# Patient Record
Sex: FEMALE | Race: WHITE | NOT HISPANIC OR LATINO | URBAN - NONMETROPOLITAN AREA
[De-identification: names, ages, dates, MRNs, and addresses within clinical notes are randomized per-mention and may not be internally consistent; named-entity substitution may affect disease eponyms.]

---

## 2022-11-14 ENCOUNTER — TRANSCRIBE ORDERS (OUTPATIENT)
Dept: CARDIOLOGY CLINIC | Facility: CLINIC | Age: 62
End: 2022-11-14

## 2022-11-14 DIAGNOSIS — N83.299 COMPLEX OVARIAN CYST: Primary | ICD-10-CM

## 2022-12-01 RX ORDER — PROPRANOLOL HYDROCHLORIDE 40 MG/1
40 TABLET ORAL 2 TIMES DAILY
COMMUNITY
Start: 2022-09-03

## 2022-12-01 RX ORDER — PANTOPRAZOLE SODIUM 40 MG/1
40 TABLET, DELAYED RELEASE ORAL DAILY
COMMUNITY
Start: 2022-09-03

## 2022-12-01 RX ORDER — CLOTRIMAZOLE 1 %
CREAM (GRAM) TOPICAL
COMMUNITY
Start: 2022-11-18

## 2022-12-01 RX ORDER — MULTIVIT,IRON,MINERALS/LUTEIN
1 TABLET ORAL DAILY
COMMUNITY

## 2022-12-01 RX ORDER — GABAPENTIN 300 MG/1
300 CAPSULE ORAL 2 TIMES DAILY
COMMUNITY

## 2022-12-01 RX ORDER — IBUPROFEN 800 MG/1
800 TABLET ORAL
COMMUNITY
Start: 2022-07-11

## 2022-12-01 RX ORDER — METFORMIN HYDROCHLORIDE 500 MG/1
TABLET, EXTENDED RELEASE ORAL
COMMUNITY
Start: 2022-09-28 | End: 2022-12-06 | Stop reason: ALTCHOICE

## 2022-12-01 RX ORDER — QUETIAPINE FUMARATE 50 MG/1
TABLET, FILM COATED ORAL
COMMUNITY
Start: 2022-06-23

## 2022-12-06 ENCOUNTER — APPOINTMENT (OUTPATIENT)
Dept: LAB | Facility: HOSPITAL | Age: 62
End: 2022-12-06

## 2022-12-06 ENCOUNTER — OFFICE VISIT (OUTPATIENT)
Dept: LAB | Facility: HOSPITAL | Age: 62
End: 2022-12-06

## 2022-12-06 ENCOUNTER — CONSULT (OUTPATIENT)
Dept: GYNECOLOGIC ONCOLOGY | Facility: CLINIC | Age: 62
End: 2022-12-06

## 2022-12-06 VITALS
BODY MASS INDEX: 38.25 KG/M2 | TEMPERATURE: 96.2 F | OXYGEN SATURATION: 99 % | SYSTOLIC BLOOD PRESSURE: 120 MMHG | DIASTOLIC BLOOD PRESSURE: 80 MMHG | WEIGHT: 238 LBS | HEIGHT: 66 IN | HEART RATE: 69 BPM

## 2022-12-06 DIAGNOSIS — N83.8 OVARIAN MASS, LEFT: Primary | ICD-10-CM

## 2022-12-06 DIAGNOSIS — N83.8 OVARIAN MASS, LEFT: ICD-10-CM

## 2022-12-06 LAB
ALBUMIN SERPL BCP-MCNC: 4.1 G/DL (ref 3.5–5)
ALP SERPL-CCNC: 51 U/L (ref 46–116)
ALT SERPL W P-5'-P-CCNC: 72 U/L (ref 12–78)
ANION GAP SERPL CALCULATED.3IONS-SCNC: 10 MMOL/L (ref 4–13)
AST SERPL W P-5'-P-CCNC: 45 U/L (ref 5–45)
BILIRUB SERPL-MCNC: 0.71 MG/DL (ref 0.2–1)
BUN SERPL-MCNC: 12 MG/DL (ref 5–25)
CALCIUM SERPL-MCNC: 9.9 MG/DL (ref 8.3–10.1)
CHLORIDE SERPL-SCNC: 102 MMOL/L (ref 96–108)
CO2 SERPL-SCNC: 26 MMOL/L (ref 21–32)
CREAT SERPL-MCNC: 1.01 MG/DL (ref 0.6–1.3)
GFR SERPL CREATININE-BSD FRML MDRD: 59 ML/MIN/1.73SQ M
GLUCOSE SERPL-MCNC: 82 MG/DL (ref 65–140)
POTASSIUM SERPL-SCNC: 3.9 MMOL/L (ref 3.5–5.3)
PROT SERPL-MCNC: 7.3 G/DL (ref 6.4–8.4)
SODIUM SERPL-SCNC: 138 MMOL/L (ref 135–147)

## 2022-12-06 RX ORDER — ACETAMINOPHEN 325 MG/1
975 TABLET ORAL ONCE
OUTPATIENT
Start: 2022-12-06 | End: 2022-12-06

## 2022-12-06 RX ORDER — HEPARIN SODIUM 5000 [USP'U]/ML
5000 INJECTION, SOLUTION INTRAVENOUS; SUBCUTANEOUS
OUTPATIENT
Start: 2022-12-07 | End: 2022-12-08

## 2022-12-06 RX ORDER — GABAPENTIN 100 MG/1
200 CAPSULE ORAL ONCE
OUTPATIENT
Start: 2022-12-06 | End: 2022-12-06

## 2022-12-06 RX ORDER — CELECOXIB 100 MG/1
200 CAPSULE ORAL ONCE
OUTPATIENT
Start: 2022-12-06 | End: 2022-12-06

## 2022-12-06 NOTE — ASSESSMENT & PLAN NOTE
4 8 cm in largest diameter by report  Some vascularity  Patient wants definitive surgical intervention  In addition, she has had multiple instances of postmenopausal bleeding the last in July with remote history of endometrial ablation  Endometrial biopsy is not feasible due to cervical stenosis  After counseling, she wishes to proceed with definitive surgical treatment and agrees to proceed with robotic hysterectomy, bilateral salpingo-oophorectomy, possible staging and all other indicated procedures  Patient has good exercise tolerance and no additional cardiovascular work-up is indicated  Preoperative testing as per procedure pass  I discussed with patient indication, risks, benefits and alternatives of surgical exploration  We discussed possibility of bleeding requiring blood transfusion, life-threatening infections requiring additional procedures, injuries to surrounding organs such as bladder, ureters, gastrointestinal tract and or neurovascular structures  Additionally, we discussed general risks associated to stress of surgery such as venous thromboembolism, acute myocardial events and stroke  All questions answered to patient's satisfaction  Agrees to proceed with blood transfusions if those are deemed necessary  Understands minimal risk of hypersensitivity reactions and infection with blood-borne pathogens  Understands minimal risk of conversion to laparotomy  She agrees and wants to proceed  Informed consent form signed

## 2022-12-06 NOTE — PROGRESS NOTES
Assessment/Plan:    Problem List Items Addressed This Visit        Other    Ovarian mass, left - Primary     4 8 cm in largest diameter by report  Some vascularity  Patient wants definitive surgical intervention  In addition, she has had multiple instances of postmenopausal bleeding the last in July with remote history of endometrial ablation  Endometrial biopsy is not feasible due to cervical stenosis  After counseling, she wishes to proceed with definitive surgical treatment and agrees to proceed with robotic hysterectomy, bilateral salpingo-oophorectomy, possible staging and all other indicated procedures  Patient has good exercise tolerance and no additional cardiovascular work-up is indicated  Preoperative testing as per procedure pass  I discussed with patient indication, risks, benefits and alternatives of surgical exploration  We discussed possibility of bleeding requiring blood transfusion, life-threatening infections requiring additional procedures, injuries to surrounding organs such as bladder, ureters, gastrointestinal tract and or neurovascular structures  Additionally, we discussed general risks associated to stress of surgery such as venous thromboembolism, acute myocardial events and stroke  All questions answered to patient's satisfaction  Agrees to proceed with blood transfusions if those are deemed necessary  Understands minimal risk of hypersensitivity reactions and infection with blood-borne pathogens  Understands minimal risk of conversion to laparotomy  She agrees and wants to proceed  Informed consent form signed                  Relevant Orders    Case request operating room: Robotic hysterectomy, bilateral salpingo-oophorectomy, possible staging and all other indicated procedures (Completed)    Comprehensive metabolic panel    EKG 12 lead    Type and screen           CHIEF COMPLAINT:   For consultation management of pelvic mass and recurrent episodes of postmenopausal bleeding      Patient ID: Mary Zaman is a 60 y o  female  HPI  56-year-old morbidly obese white female  A3 with remote history of endometrial ablation at around 10 to 15 years ago  Reports multiple instances of postmenopausal bleeding, last episode in July of this year  No attempted biopsy has been done as per patient's report  Recently presented to emergency department in [facility] after trauma and CT scan demonstrated incidental left ovarian mass  Ultrasound speaks of 4 8 cm vascular left ovarian mass  Images not available to review  Patient presents today as she wishes to proceed with definitive surgical treatment  Last mammogram: Spring 2022  Reportedly negative  Last colonoscopy: 8 years ago  Reportedly negative  Review of Systems  As per HPI  Review of systems otherwise unremarkable  Current Outpatient Medications   Medication Sig Dispense Refill   • clotrimazole (LOTRIMIN) 1 % cream APPLY TO AFFECTED AREA(S) TWO TIMES A DAY AS NEEDED AT CORNERS OF THE MOUTH  • gabapentin (NEURONTIN) 300 mg capsule Take 300 mg by mouth 2 (two) times a day     • ibuprofen (MOTRIN) 800 mg tablet Take 800 mg by mouth     • Multiple Vitamins-Minerals (Centrum Silver Ultra Womens) TABS Take 1 tablet by mouth daily     • pantoprazole (PROTONIX) 40 mg tablet Take 40 mg by mouth daily     • propranolol (INDERAL) 40 mg tablet Take 40 mg by mouth 2 (two) times a day     • QUEtiapine (SEROquel) 50 mg tablet 50mg bid, 300mg qhs       No current facility-administered medications for this visit         Allergies   Allergen Reactions   • Pollen Extract Other (See Comments)       Past Medical History:   Diagnosis Date   • Achalasia of cardia    • Alcoholism ([facility] [address] )    • Bipolar 1 disorder ([address] )    • Cholesteatoma of left ear    • Chronic lower back pain    • Chronic mastoiditis of left side    • Combined opioid and non-opioid drug dependence, in remission ([address] )     8 years clean   • Fatty liver    • Hiatal hernia    • HTN (hypertension)    • Obesity    • Sicca syndrome [facility])        Past Surgical History:   Procedure Laterality Date   • APPENDECTOMY     • BLADDER REPAIR  06/17/2017    vaginal sling procedure for stress incontinence   • COLONOSCOPY  04/04/2013   • CYSTOSCOPY  06/17/2017   • CYSTOURETHROSCOPY     • DILATION AND CURETTAGE OF UTERUS      SAB x2   • EGD  12/21/2017   • FASCIOTOMY Left 10/30/2018    left plantar   • HYSTEROSCOPY  01/01/2006    endometrial ablation   • LAPAROSCOPIC CHOLECYSTECTOMY  11/13/2017   • MASTOIDECTOMY  02/25/2022    remove mastoid structures   • MYRINGOTOMY Left 02/25/2022   • NASAL SEPTUM SURGERY  1992   • POLYPECTOMY  04/02/2013    w/wo D&C   • TONSILECTOMY AND ADNOIDECTOMY      age 14   • TYMPANOPLASTY  07/28/2022    left ear tube 10/6/2022       OB History    No obstetric history on file  Family History   Problem Relation Age of Onset   • Hypertension Mother    • Stroke Father    • Alcohol abuse Father    • Hypertension Sister    • Hypertension Brother    • Tourette syndrome Brother    • Ovarian cancer Maternal Grandmother    • Leukemia Maternal Grandfather    • Breast cancer Paternal Grandmother        The following portions of the patient's history were reviewed and updated as appropriate: allergies, current medications, past family history, past medical history, past social history, past surgical history and problem list       Objective:    Blood pressure 120/80, pulse 69, temperature (!) 96 2 °F (35 7 °C), height 5' 6" (1 676 m), weight 108 kg (238 lb), SpO2 99 %  Body mass index is 38 41 kg/m²  Physical Exam  Vitals reviewed  Exam conducted with a chaperone present  Constitutional:       Appearance: Normal appearance  She is obese  Eyes:      General: No scleral icterus  Right eye: No discharge  Left eye: No discharge  Conjunctiva/sclera: Conjunctivae normal    Cardiovascular:      Rate and Rhythm: Normal rate and regular rhythm  Heart sounds: Normal heart sounds  No murmur heard  Pulmonary:      Effort: Pulmonary effort is normal       Breath sounds: Normal breath sounds  No rhonchi or rales  Abdominal:      General: There is no distension  Palpations: Abdomen is soft  There is no mass  Tenderness: There is no abdominal tenderness  There is no guarding  Hernia: No hernia is present  Genitourinary:     Comments: Normal external female genitalia  Normal [place]th[place]'s [place]'s glands  Normal urethral meatus and no evidence of urethral discharge or masses  Bladder without fullness mass or tenderness  Vagina without lesion or discharge  No significant pelvic organ prolapse noted  Cervix grossly normal appearing without visible lesions  Uterus nontender with normal contour, size and mobility  Adnexae without mass or tenderness  Anus without fissure of lesion  Musculoskeletal:      Right lower leg: No edema  Left lower leg: No edema  Neurological:      Mental Status: She is alert  Pelvic ultrasound report November 10, 2022 at [facility] [place]: Uterus 3 x 3 7 x 6 2 cm  Left ovary 4 1 x 4 4 x 4 8 cm with abnormal vascularity noted  No discrete masses  Right ovary not seen  The images are not available for review      Maud Carrel, MD, [address] [place] [address]  12/6/2022  3:17 PM

## 2022-12-07 ENCOUNTER — LAB REQUISITION (OUTPATIENT)
Dept: LAB | Facility: HOSPITAL | Age: 62
End: 2022-12-07

## 2022-12-07 DIAGNOSIS — N83.8 OTHER NONINFLAMMATORY DISORDERS OF OVARY, FALLOPIAN TUBE AND BROAD LIGAMENT: ICD-10-CM

## 2022-12-07 LAB
ABO GROUP BLD: NORMAL
BLD GP AB SCN SERPL QL: NEGATIVE
RH BLD: POSITIVE
SPECIMEN EXPIRATION DATE: NORMAL

## 2022-12-11 LAB
ATRIAL RATE: 66 BPM
P AXIS: 20 DEGREES
PR INTERVAL: 162 MS
QRS AXIS: -7 DEGREES
QRSD INTERVAL: 96 MS
QT INTERVAL: 400 MS
QTC INTERVAL: 419 MS
T WAVE AXIS: -10 DEGREES
VENTRICULAR RATE: 66 BPM

## 2023-01-03 ENCOUNTER — APPOINTMENT (OUTPATIENT)
Dept: PREADMISSION TESTING | Facility: HOSPITAL | Age: 63
End: 2023-01-03

## 2023-01-04 ENCOUNTER — ANESTHESIA EVENT (OUTPATIENT)
Dept: PERIOP | Facility: HOSPITAL | Age: 63
End: 2023-01-04

## 2023-01-04 RX ORDER — LITHIUM CARBONATE 300 MG/1
300 CAPSULE ORAL
COMMUNITY

## 2023-01-04 RX ORDER — OMEGA-3S/DHA/EPA/FISH OIL/D3 300MG-1000
400 CAPSULE ORAL DAILY
COMMUNITY

## 2023-01-04 RX ORDER — LITHIUM CARBONATE 150 MG/1
150 CAPSULE ORAL EVERY MORNING
COMMUNITY

## 2023-01-04 NOTE — PRE-PROCEDURE INSTRUCTIONS
Pre-Surgery Instructions:   Medication Instructions   • cholecalciferol (VITAMIN D3) 400 units tablet Stop taking 7 days prior to surgery  • clotrimazole (LOTRIMIN) 1 % cream hold after surgical showers   • gabapentin (NEURONTIN) 300 mg capsule Take day of surgery  • ibuprofen (MOTRIN) 800 mg tablet Stop taking 7 days prior to surgery  • lithium carbonate 150 mg capsule Take day of surgery  • lithium carbonate 300 mg capsule Take night before surgery   • Multiple Vitamins-Minerals (Centrum Silver Ultra Womens) TABS Stop taking 7 days prior to surgery  • pantoprazole (PROTONIX) 40 mg tablet Take day of surgery  • propranolol (INDERAL) 40 mg tablet Take day of surgery  • QUEtiapine (SEROquel) 50 mg tablet Take night before surgery      - Reviewed with patient, in detail, instructions from "My Surgical Experience"  - Instructed to avoid all OTC vitamins/supplements and NSAIDS for one week prior to surgery per anesthesia guidelines  Tylenol ok to take PRN  - Advised patient nothing eat or drink after midnight prior to surgery, except medications that he/she is to take morning DOS with only small sip of water     - Advised patient that Wyatt Horn will call with surgery arrival time and hospital directions the business day prior to surgery  Patient verbalized understanding of current visitor restrictions/masking guidelines and advised that he/she can confirm these at time of arrival call with Wyatt Horn      - Patient verbalized understanding and knows to call surgeon's office with any additional questions prior to surgery  Instructed to call surgeon's office in meantime with any new illnesses/exposure, patient verbalized understanding

## 2023-01-12 ENCOUNTER — ANESTHESIA (OUTPATIENT)
Dept: PERIOP | Facility: HOSPITAL | Age: 63
End: 2023-01-12

## 2023-01-12 ENCOUNTER — HOSPITAL ENCOUNTER (OUTPATIENT)
Facility: HOSPITAL | Age: 63
Setting detail: OUTPATIENT SURGERY
Discharge: HOME | End: 2023-01-12
Attending: OBSTETRICS & GYNECOLOGY | Admitting: OBSTETRICS & GYNECOLOGY

## 2023-01-12 VITALS
TEMPERATURE: 97.8 F | OXYGEN SATURATION: 93 % | BODY MASS INDEX: 37.63 KG/M2 | WEIGHT: 234.13 LBS | HEART RATE: 69 BPM | SYSTOLIC BLOOD PRESSURE: 141 MMHG | DIASTOLIC BLOOD PRESSURE: 76 MMHG | HEIGHT: 66 IN | RESPIRATION RATE: 16 BRPM

## 2023-01-12 DIAGNOSIS — N83.8 OVARIAN MASS, LEFT: ICD-10-CM

## 2023-01-12 RX ORDER — ACETAMINOPHEN 325 MG/1
975 TABLET ORAL ONCE
Status: COMPLETED | OUTPATIENT
Start: 2023-01-12 | End: 2023-01-12

## 2023-01-12 RX ORDER — MIDAZOLAM HYDROCHLORIDE 2 MG/2ML
INJECTION, SOLUTION INTRAMUSCULAR; INTRAVENOUS AS NEEDED
Status: DISCONTINUED | OUTPATIENT
Start: 2023-01-12 | End: 2023-01-12

## 2023-01-12 RX ORDER — SODIUM CHLORIDE, SODIUM LACTATE, POTASSIUM CHLORIDE, CALCIUM CHLORIDE 600; 310; 30; 20 MG/100ML; MG/100ML; MG/100ML; MG/100ML
125 INJECTION, SOLUTION INTRAVENOUS CONTINUOUS
Status: DISCONTINUED | OUTPATIENT
Start: 2023-01-12 | End: 2023-01-12 | Stop reason: HOSPADM

## 2023-01-12 RX ORDER — LIDOCAINE HYDROCHLORIDE 10 MG/ML
INJECTION, SOLUTION EPIDURAL; INFILTRATION; INTRACAUDAL; PERINEURAL AS NEEDED
Status: DISCONTINUED | OUTPATIENT
Start: 2023-01-12 | End: 2023-01-12

## 2023-01-12 RX ORDER — MEPERIDINE HYDROCHLORIDE 25 MG/ML
12.5 INJECTION INTRAMUSCULAR; INTRAVENOUS; SUBCUTANEOUS
Status: DISCONTINUED | OUTPATIENT
Start: 2023-01-12 | End: 2023-01-12 | Stop reason: HOSPADM

## 2023-01-12 RX ORDER — CELECOXIB 200 MG/1
200 CAPSULE ORAL ONCE
Status: DISCONTINUED | OUTPATIENT
Start: 2023-01-12 | End: 2023-01-12

## 2023-01-12 RX ORDER — MAGNESIUM HYDROXIDE 1200 MG/15ML
LIQUID ORAL AS NEEDED
Status: DISCONTINUED | OUTPATIENT
Start: 2023-01-12 | End: 2023-01-12 | Stop reason: HOSPADM

## 2023-01-12 RX ORDER — CIPROFLOXACIN AND DEXAMETHASONE 3; 1 MG/ML; MG/ML
4 SUSPENSION/ DROPS AURICULAR (OTIC) 2 TIMES DAILY
COMMUNITY

## 2023-01-12 RX ORDER — KETOROLAC TROMETHAMINE 30 MG/ML
INJECTION, SOLUTION INTRAMUSCULAR; INTRAVENOUS AS NEEDED
Status: DISCONTINUED | OUTPATIENT
Start: 2023-01-12 | End: 2023-01-12

## 2023-01-12 RX ORDER — HYDROMORPHONE HCL/PF 1 MG/ML
0.5 SYRINGE (ML) INJECTION
Status: DISCONTINUED | OUTPATIENT
Start: 2023-01-12 | End: 2023-01-12 | Stop reason: HOSPADM

## 2023-01-12 RX ORDER — BUPIVACAINE HYDROCHLORIDE 2.5 MG/ML
INJECTION, SOLUTION EPIDURAL; INFILTRATION; INTRACAUDAL AS NEEDED
Status: DISCONTINUED | OUTPATIENT
Start: 2023-01-12 | End: 2023-01-12 | Stop reason: HOSPADM

## 2023-01-12 RX ORDER — DEXAMETHASONE SODIUM PHOSPHATE 10 MG/ML
INJECTION, SOLUTION INTRAMUSCULAR; INTRAVENOUS AS NEEDED
Status: DISCONTINUED | OUTPATIENT
Start: 2023-01-12 | End: 2023-01-12

## 2023-01-12 RX ORDER — CEFAZOLIN SODIUM 2 G/50ML
2000 SOLUTION INTRAVENOUS ONCE
Status: DISCONTINUED | OUTPATIENT
Start: 2023-01-12 | End: 2023-01-12 | Stop reason: HOSPADM

## 2023-01-12 RX ORDER — CEFAZOLIN SODIUM 2 G/50ML
SOLUTION INTRAVENOUS AS NEEDED
Status: DISCONTINUED | OUTPATIENT
Start: 2023-01-12 | End: 2023-01-12

## 2023-01-12 RX ORDER — ONDANSETRON 2 MG/ML
4 INJECTION INTRAMUSCULAR; INTRAVENOUS ONCE AS NEEDED
Status: DISCONTINUED | OUTPATIENT
Start: 2023-01-12 | End: 2023-01-12 | Stop reason: HOSPADM

## 2023-01-12 RX ORDER — FENTANYL CITRATE/PF 50 MCG/ML
25 SYRINGE (ML) INJECTION
Status: DISCONTINUED | OUTPATIENT
Start: 2023-01-12 | End: 2023-01-12 | Stop reason: HOSPADM

## 2023-01-12 RX ORDER — BUPROPION HYDROCHLORIDE 100 MG/1
100 TABLET ORAL DAILY
COMMUNITY

## 2023-01-12 RX ORDER — FENTANYL CITRATE 50 UG/ML
INJECTION, SOLUTION INTRAMUSCULAR; INTRAVENOUS AS NEEDED
Status: DISCONTINUED | OUTPATIENT
Start: 2023-01-12 | End: 2023-01-12

## 2023-01-12 RX ORDER — PROPOFOL 10 MG/ML
INJECTION, EMULSION INTRAVENOUS CONTINUOUS PRN
Status: DISCONTINUED | OUTPATIENT
Start: 2023-01-12 | End: 2023-01-12

## 2023-01-12 RX ORDER — GABAPENTIN 100 MG/1
200 CAPSULE ORAL ONCE
Status: DISCONTINUED | OUTPATIENT
Start: 2023-01-12 | End: 2023-01-12 | Stop reason: HOSPADM

## 2023-01-12 RX ORDER — ROCURONIUM BROMIDE 10 MG/ML
INJECTION, SOLUTION INTRAVENOUS AS NEEDED
Status: DISCONTINUED | OUTPATIENT
Start: 2023-01-12 | End: 2023-01-12

## 2023-01-12 RX ORDER — HEPARIN SODIUM 5000 [USP'U]/ML
5000 INJECTION, SOLUTION INTRAVENOUS; SUBCUTANEOUS
Status: COMPLETED | OUTPATIENT
Start: 2023-01-12 | End: 2023-01-12

## 2023-01-12 RX ORDER — PROPOFOL 10 MG/ML
INJECTION, EMULSION INTRAVENOUS AS NEEDED
Status: DISCONTINUED | OUTPATIENT
Start: 2023-01-12 | End: 2023-01-12

## 2023-01-12 RX ORDER — SODIUM CHLORIDE 9 MG/ML
INJECTION, SOLUTION INTRAVENOUS CONTINUOUS PRN
Status: DISCONTINUED | OUTPATIENT
Start: 2023-01-12 | End: 2023-01-12

## 2023-01-12 RX ADMIN — DEXAMETHASONE SODIUM PHOSPHATE 10 MG: 10 INJECTION INTRAMUSCULAR; INTRAVENOUS at 13:20

## 2023-01-12 RX ADMIN — SODIUM CHLORIDE: 9 INJECTION, SOLUTION INTRAVENOUS at 13:25

## 2023-01-12 RX ADMIN — PROPOFOL 150 MG: 10 INJECTION, EMULSION INTRAVENOUS at 13:20

## 2023-01-12 RX ADMIN — ACETAMINOPHEN 975 MG: 325 TABLET, FILM COATED ORAL at 12:01

## 2023-01-12 RX ADMIN — FENTANYL CITRATE 25 MCG: 50 INJECTION INTRAMUSCULAR; INTRAVENOUS at 14:56

## 2023-01-12 RX ADMIN — MIDAZOLAM 3 MG: 1 INJECTION INTRAMUSCULAR; INTRAVENOUS at 13:08

## 2023-01-12 RX ADMIN — KETOROLAC TROMETHAMINE 15 MG: 30 INJECTION, SOLUTION INTRAMUSCULAR at 15:06

## 2023-01-12 RX ADMIN — ROCURONIUM BROMIDE 50 MG: 10 INJECTION, SOLUTION INTRAVENOUS at 13:20

## 2023-01-12 RX ADMIN — FENTANYL CITRATE 25 MCG: 50 INJECTION INTRAMUSCULAR; INTRAVENOUS at 14:32

## 2023-01-12 RX ADMIN — SUGAMMADEX 200 MG: 100 INJECTION, SOLUTION INTRAVENOUS at 14:52

## 2023-01-12 RX ADMIN — SODIUM CHLORIDE, SODIUM LACTATE, POTASSIUM CHLORIDE, AND CALCIUM CHLORIDE 125 ML/HR: 600; 310; 30; 20 INJECTION, SOLUTION INTRAVENOUS at 12:07

## 2023-01-12 RX ADMIN — LIDOCAINE HYDROCHLORIDE 80 MG: 10 INJECTION, SOLUTION EPIDURAL; INFILTRATION; INTRACAUDAL; PERINEURAL at 13:20

## 2023-01-12 RX ADMIN — FENTANYL CITRATE 25 MCG: 50 INJECTION, SOLUTION INTRAMUSCULAR; INTRAVENOUS at 17:05

## 2023-01-12 RX ADMIN — FENTANYL CITRATE 25 MCG: 50 INJECTION, SOLUTION INTRAMUSCULAR; INTRAVENOUS at 16:24

## 2023-01-12 RX ADMIN — CEFAZOLIN SODIUM 2000 MG: 2 SOLUTION INTRAVENOUS at 13:18

## 2023-01-12 RX ADMIN — FENTANYL CITRATE 50 MCG: 50 INJECTION INTRAMUSCULAR; INTRAVENOUS at 13:20

## 2023-01-12 RX ADMIN — HEPARIN SODIUM 5000 UNITS: 5000 INJECTION INTRAVENOUS; SUBCUTANEOUS at 12:26

## 2023-01-12 RX ADMIN — PROPOFOL 80 MCG/KG/MIN: 10 INJECTION, EMULSION INTRAVENOUS at 13:33

## 2023-01-12 NOTE — OP NOTE
OPERATIVE REPORT  PATIENT NAME: Jorge Delgado    :  1960  MRN: [id number]  Pt Location: [facility] OR ROOM 07    SURGERY DATE: 2023    Surgeon(s) and Role:     * Fredi Parsons MD - Primary     * Sandra Rico PA-C - Delicia Oliveira MD - Natacha Lombardo MD - To    Preop Diagnosis:  Ovarian mass, left [N83 8]    Post-Op Diagnosis Codes:     * Ovarian mass, left [N83 8]    Procedure(s) (LRB):  ROBOT HYSTERECTOMY, BILATERAL SALPINGO-OOPHORECTOMY (N/A)  CYSTOSCOPY (N/A)    Specimen(s):  ID Type Source Tests Collected by Time Destination   1 :  Washing Pelvic Washing NON-GYNECOLOGIC CYTOLOGY Fredi Parsons MD 2023 1357    2 : left ovarian mass Tissue Ovary, Left TISSUE EXAM Fredi Parsons MD 2023 1415    3 : with cervix and right tube and ivary Tissue Uterus TISSUE EXAM Fredi Parsons MD 2023 1418        Estimated Blood Loss:   Minimal    Drains:  Urethral Catheter Double-lumen;Non-latex 16 Fr  (Active)   Number of days: 0       Anesthesia Type:   General    Operative Indications:  Ovarian mass, left [N83 8]  Recurrent episodes of postmenopausal bleeding status post endometrial ablation    Operative Findings:  #1  Cervical stenosis  #2   Uterus approximately 8-10-week size  No evidence of extrarenal disease  #3   Approximately 5 to 6 cm multicystic/nodular left ovary  Frozen section consistent with possible mucinous cystadenofibroma  No evidence of atypia or malignancy  #4   Grossly normal right fallopian tube and ovary  #5   Cystoscopy demonstrated normal bladder mucosa and bilateral potent ureteral urine jets  Complications:   None    Procedure and Technique:  The patient was taken to the operating room where general endotracheal anesthesia was induced without complications  The patient received antibiotic prophylaxis per hospital protocol    Sequential compression devices were applied to the lower extremities and activated prior to induction of anesthesia  A single dose of preoperative heparin was administered  The patient was placed in the dorsolithotomy position in [address] [place] [address] and her lower abdomen, perineum and vagina were prepped and draped in the usual sterile fashion  Under direct visualization the uterus was sounded to approximately 9 cm  The endocervix was dilated  A KRISH uterine manipulator was secured in place with a stitch of 0 Vicryl  Lee catheter was placed and the intravaginal occluder balloon was inflated  Attention was turned to the abdomen  All port sites were infiltrated with bupivacaine at the beginning of completion of the procedure  An 8 mm skin incision was made approximately 5 cm above the umbilicus near the midline  Then, using a 5 mm Endopath Excel trocar and the 5 mm laparoscope, the peritoneal cavity was entered under directvisualization  Good intraperitoneal location was confirmed and pneumoperitoneum was created to maximal pressure 15 mm of mercury  A total of four 8 mm robotic trocars were placed (2 on the left, 1 in the midline replacing the 5 mm entry port and 1 on the right) and the 5 mm entry trocar was reinserted in the right flank for assistant's use  The patient was placed in steep Trendelenburg and the [employer] system was docked  The above-mentioned findings were noted  The round ligaments were cauterized and divided bilaterally and the bladder was mobilized anteriorly without difficulty  The ureters were clearly identified transperitoneally  The ovarian vessels were skeletonized,cauterized and divided bilaterally  The left utero-ovarian ligament and fallopian tube were cauterized and divided  The left ovarian vessels were skeletonized up to the level of the pelvic brim and divided with the vessel sealer device  The ovary was temporarily placed in the left upper quadrant and later retrieved through the vagina       The fallopian tubes and ovaries were mobilized without difficulties  The uterine vessels were skeletonized cauterized and divided bilaterally  The cardinal ligaments were serially cauterized and divided and a circumferential colpotomy was made  The specimens were removed through the vagina  The vaginal cuff was closed using a running stitch of 2-0 PDO Stratafix  Airtight closure an excellent hemostasis was obtained  Copious irrigation was used and excellent hemostasis was confirmed at low intraperitoneal pressures  At this point the robot was undocked  Pneumoperitoneum was completely released with the assistance of Valsalva maneuvers  All trocars were removed and the skin was closed using a subcuticular stitch of 4-0 Monocryl and Exofin was applied to all incisions  The Lee was removed  Using the [place], the bladder was retrograde filled with approximately 150 mL of NS  We examined the bladder then using the 5 mm laparoscope  The above mentioned findings were noted  The Lee catheter was then used to drain the bladder and the catheter was then removed  The patient tolerated the procedure well  Sponge, lap, needle and instrument counts were reported as correct x2  At the time of this dictation the patient remained stable in the operating room awaiting extubation         I was present for the entire procedure    Patient Disposition:  PACU     SIGNATURE: Chadd Acosta MD, Nathan Dickerson  DATE: January 12, 2023  TIME: 2:49 PM

## 2023-01-12 NOTE — DISCHARGE INSTR - AVS FIRST PAGE
Robotic hysterectomy, bilateral salpingo-oophorectomy, cystoscopy  Discharge Instructions    WHAT YOU NEED TO KNOW:   Today we removed your uterus, cervix and bilateral fallopian tubes and ovaries  Your left ovary had a benign tumor with no evidence of atypia or malignancy (no cancer or precancer)  At the end of procedure we looked inside your bladder and there was no evidence of injuries or abnormalities  Your procedures were uncomplicated  Apply ice pack to incisions for the first 24 to 48 hours to improve discomfort/pain  Narcotic medications will not be prescribed for this procedure  Use extra strength Tylenol and Advil as instructed below  Alternate dose to medications  Drink plenty of fluids and take medication with food  Contact Dr Jean Carlos Baxter on his cell phone at [phone] if your pain is not controlled or if you have any other problems  DISCHARGE INSTRUCTIONS:   Contact your doctor at the number above if:   You have a fever over 101o  You have nausea or are vomiting that does not improve after a light meal    Your pain is getting worse, even after you take medicine  You feel pain or burning when you urinate, or you have trouble urinating  You have pus or a foul-smelling odor coming from your vagina and/or wound  Your wound is red, swollen, or draining pus  You see new or an increased amount of bright red blood coming from your vagina or your incisions  You have questions or concerns about your condition or care  Seek care immediately:   Your arm or leg feels warm, tender, and painful  It may look swollen and red  You have increasing abdominal or pelvic pain  You have heavy vaginal bleeding that fills 1 or more sanitary pads in 1 hour  Call 911 for any of the following: You feel lightheaded, short of breath, and have chest pain  You cough up blood  Medicines: You may need any of the following:  Prescription medicine will not be given for this procedure  Resume your previous medications as directed  Extra-strength Tylenol: This medications over-the-counter  Take 2 tablets every 6 hours as needed for mild-to-moderate pain  Ibuprofen/Advil/Motrin 200 mg tablets: This medication is over-the-counter  Take 3 tablets every 8 hours as needed for moderate to severe pain  Take this medication with food  The drink plenty of fluids while using this medication to prevent kidney injury  Metamucil or MiraLax: This product is over-the-counter  Distal 1 large tbsp in a large glass of water  Use once or twice a day for 1-2 weeks prevent and or treat constipation  Take your medicines as directed  Contact your healthcare provider if you think your medicine is not helping or if you have side effects  Tell him or her if you are allergic to any medicine  Keep a list of the medicines, vitamins, and herbs you take  Include the amounts, and when and why you take them  Bring the list or the pill bottles to follow-up visits  Carry your medicine list with you in case of an emergency  Activity:   Rest as needed  Get up and move around as directed to help prevent blood clots  Start with short walks and slowly increase the distance every day  Limit the number of times you climb stairs to 2 times each day for the first week  Plan most of your daily activities on one level of your home  Do not lift objects heavier than 10 pounds until seen in the office for your follow-up appointment  Do not strain during bowel movements  High-fiber foods and extra liquids can help you prevent constipation  Examples of high-fiber foods are fruit and bran  Prune juice and water are good liquids to drink  Do not have sex, use tampons, or douche and do not do tub baths/swimming until cleared by your physician at your postoperative appointment  You may shower as soon as the day after surgery  Do not go into pools or hot tubs until cleared by your doctor        Ask when it is safe for you to drive  It is generally safe to drive as soon as your legs are strong, you are off pain medicines and you feel like you will have the appropriate reflexes to step on the breaks in case of an emergency  Ask when you may return to work and to other regular activities  Wound care: Care for your abdominal incisions as directed  Carefully wash around the wound with soap and water  If you have Hibiclens or medicated soap that you were instructed to use before surgery, you may use that to wash with for up to 2 days after surgery  If not, any mild non-scented, non-abrasive soap is safe  It is okay to let the soap and water run over your incision  Do not scrub your incision  Dry the area and put on new, clean bandages as directed  Change your bandages when they get wet or dirty  If you have strips of medical tape, let them fall off on their own  It may take 7 to 14 days for them to fall off  Check your incision every day for redness, swelling, or pus  Deep breathing: Take deep breaths and cough 10 times each hour  This will decrease your risk for a lung infection  Take a deep breath and hold it for as long as you can  Let the air out and then cough strongly  Deep breaths help open your airway  You may be given an incentive spirometer to help you take deep breaths  Put the plastic piece in your mouth and take a slow, deep breath, then let the air out and cough  Repeat these steps 10 times every hour  Get support: This surgery may be life-changing for you and your family  You will no longer be able to get pregnant  Sudden changes in the levels of your hormones may occur and cause mood swings and depression  You may feel angry, sad, or frightened, or cry frequently and unexpectedly  These feelings are normal  Talk to your healthcare provider about where you can get support  You can also ask if hormone replacement medicine is right for you     Follow up with your healthcare provider or gynecologist as directed: You may need to return to have stitches removed, and for other tests  Write down your questions so you remember to ask them during your visits  © 2017 [employer] Information is for End User's use only and may not be sold, redistributed or otherwise used for commercial purposes  All illustrations and images included in CareNotes® are the copyrighted property of A D A M , Inc  or [employer]  The above information is an  only  It is not intended as medical advice for individual conditions or treatments  Talk to your doctor, nurse or pharmacist before following any medical regimen to see if it is safe and effective for you

## 2023-01-12 NOTE — ANESTHESIA POSTPROCEDURE EVALUATION
Post-Op Assessment Note    CV Status:  Stable  Pain Score: 0    Pain management: adequate     Mental Status:  Alert and awake   Hydration Status:  Euvolemic   PONV Controlled:  Controlled   Airway Patency:  Patent      Post Op Vitals Reviewed: Yes      Staff: CRNA         No notable events documented      /54 (01/12/23 1517)    Temp 98 1 °F (36 7 °C) (01/12/23 1517)    Pulse (!) 54 (01/12/23 1517)   Resp 20 (01/12/23 1517)    SpO2 97 % (01/12/23 1517)

## 2023-01-12 NOTE — H&P
H&P Exam - Gynecologic Oncology   Bety Cortes 60 y o  female MRN: [id number]  Unit/Bed#: OR POOL Encounter: [id number]    Assessment/Plan     1  5 cm adnexal mass, recurrent episodes of postmenopausal bleeding: Here for definitive surgery  Consented for robotic TLH/BSO, possible staging and all other indicated procedures  Agrees to proceed to OR as planned  All questions answered  Preop testing results noted  History of Present Illness   HX and PE limited by:   HPI:  Bety Cortes is a 60 y o  female morbidly obese, white,  A3 with remote history of endometrial ablation at around 10 to 15 years ago  Reports multiple instances of postmenopausal bleeding, last episode in July of this year  No attempted biopsy has been done as per patient's report  Recently presented to emergency department in [facility] after trauma and CT scan demonstrated incidental left ovarian mass  Ultrasound speaks of 4 8 cm vascular left ovarian mass  Images not available to review  Patient presents today for  definitive surgical treatment  Review of Systems  As per HPI  Otherwise non-contributory      Historical Information   Previous Oncology History: N/A  Past Medical History:   Diagnosis Date   • Achalasia of cardia    • Alcoholism ([address] )    • Bipolar 1 disorder ([address] )    • Cholesteatoma of left ear    • Chronic lower back pain    • Chronic mastoiditis of left side    • Combined opioid and non-opioid drug dependence, in remission ([address] )     8 years clean   • COVID-19 2021    mild s/s   • Fatty liver    • Hiatal hernia    • HTN (hypertension)    • Hypertension    • Obesity    • Pneumonia    • PONV (postoperative nausea and vomiting)    • Sicca syndrome ([address] )     Sjogrens syndrome     Past Surgical History:   Procedure Laterality Date   • APPENDECTOMY     • BLADDER REPAIR  2017    vaginal sling procedure for stress incontinence   • COLONOSCOPY  2013   • CYSTOSCOPY  2017   • CYSTOURETHROSCOPY     • DILATION AND CURETTAGE OF UTERUS      SAB x2   • EGD  12/21/2017   • FASCIOTOMY Left 10/30/2018    left plantar   • HYSTEROSCOPY  01/01/2006    endometrial ablation   • LAPAROSCOPIC CHOLECYSTECTOMY  11/13/2017   • MASTOIDECTOMY  02/25/2022    remove mastoid structures   • MYRINGOTOMY Left 02/25/2022    x3   • NASAL SEPTUM SURGERY  1992   • POLYPECTOMY  04/02/2013    w/wo D&C   • TONSILECTOMY AND ADNOIDECTOMY      age 14   • [address]   • TYMPANOPLASTY  07/28/2022    left ear tube 10/6/2022     OB/GYN History: As per HPI  Family History   Problem Relation Age of Onset   • Hypertension Mother    • Stroke Father    • Alcohol abuse Father    • Hypertension Sister    • Hypertension Brother    • Tourette syndrome Brother    • Ovarian cancer Maternal Grandmother    • Leukemia Maternal Grandfather    • Breast cancer Paternal Grandmother      Social History   Social History     Substance and Sexual Activity   Alcohol Use Not Currently    Comment: 15 years ETOH recovering 8 years Active in AA     Social History     Substance and Sexual Activity   Drug Use Not Currently    Comment: remission x 8 years     Social History     Tobacco Use   Smoking Status Never   Smokeless Tobacco Never     E-Cigarette/Vaping   • E-Cigarette Use Never User      E-Cigarette/Vaping Substances   • Nicotine No    • THC No    • CBD No    • Flavoring No    • Other No    • Unknown No        Meds/Allergies   all current active meds have been reviewed  Allergies   Allergen Reactions   • Pollen Extract Other (See Comments)       Objective   Vitals: Blood pressure 115/56, pulse 60, temperature 98 4 °F (36 9 °C), temperature source Temporal, resp  rate 16, height 5' 6" (1 676 m), weight 106 kg (234 lb 2 1 oz), SpO2 96 %  No intake or output data in the 24 hours ending 01/12/23 1237    Invasive Devices:   Peripheral IV 01/12/23 Dorsal (posterior); Left Hand (Active)   Site Assessment Clean;Dry; Intact; WDL 01/12/23 1219   Dressing Type Transparent 01/12/23 1219   Line Status Blood return noted; Infusing 01/12/23 1219   Dressing Status Clean;Dry; Intact 01/12/23 1219       Physical Exam  Vitals reviewed  Exam conducted with a chaperone present  Constitutional:       General: She is not in acute distress  Appearance: Normal appearance  She is not toxic-appearing or diaphoretic  Eyes:      General:         Right eye: No discharge  Left eye: No discharge  Conjunctiva/sclera: Conjunctivae normal    Cardiovascular:      Rate and Rhythm: Normal rate and regular rhythm  Pulmonary:      Effort: Pulmonary effort is normal  No respiratory distress  Breath sounds: No stridor  Abdominal:      General: There is no distension  Palpations: Abdomen is soft  There is no mass  Tenderness: There is no abdominal tenderness  There is no guarding  Genitourinary:     Comments: Pelvic exam deferred to OR  See office exam from December 2022  Musculoskeletal:      Right lower leg: No edema  Left lower leg: No edema  Lab Results: I have personally reviewed pertinent reports  Imaging: I have personally reviewed pertinent reports         Malik Vivas MD, [facility]  1/12/2023  12:39 PM

## 2023-01-12 NOTE — ANESTHESIA PREPROCEDURE EVALUATION
Procedure:  ROBOT HYSTERECTOMY, BILATERAL SALPINGO-OOPHORECTOMY, PSB STAGING (Abdomen)    Relevant Problems   ANESTHESIA   (+) PONV (postoperative nausea and vomiting)      CARDIO   (+) HTN (hypertension)      Other   (+) Alcoholism (HCC)   (+) Bipolar 1 disorder (HCC)   (+) Combined opioid and non-opioid drug dependence, in remission (HCC)   (+) Obesity   (+) Ovarian mass, left        Physical Exam    Airway    Mallampati score: II  TM Distance: >3 FB  Neck ROM: full     Dental   No notable dental hx     Cardiovascular  Rhythm: regular, Rate: normal, Cardiovascular exam normal    Pulmonary  Pulmonary exam normal Breath sounds clear to auscultation,     Other Findings        Anesthesia Plan  ASA Score- 3     Anesthesia Type- general with ASA Monitors  Additional Monitors:   Airway Plan: ETT  Plan Factors-    Chart reviewed  Existing labs reviewed  Patient summary reviewed  Patient is not a current smoker  Patient not instructed to abstain from smoking on day of procedure  Patient did not smoke on day of surgery  There is medical exclusion for perioperative obstructive sleep apnea risk education  Induction- intravenous  Postoperative Plan-     Informed Consent- Anesthetic plan and risks discussed with patient

## 2023-01-13 ENCOUNTER — TELEPHONE (OUTPATIENT)
Dept: HEMATOLOGY ONCOLOGY | Facility: CLINIC | Age: 63
End: 2023-01-13

## 2023-01-13 DIAGNOSIS — G89.18 POSTOPERATIVE PAIN: Primary | ICD-10-CM

## 2023-01-13 RX ORDER — OXYCODONE HYDROCHLORIDE 5 MG/1
5 TABLET ORAL EVERY 6 HOURS PRN
Qty: 10 TABLET | Refills: 0 | Status: SHIPPED | OUTPATIENT
Start: 2023-01-13

## 2023-01-13 NOTE — TELEPHONE ENCOUNTER
Patient calling in stating that she is having sever cramps and burning when she urinates  She was unable to sleep all night  She is currently using a heating pad which is helping somewhat with the pain  Please advise

## 2023-01-13 NOTE — TELEPHONE ENCOUNTER
Return call placed to patient  Will plan for oxycodone 5mg PO every 6hrs, #10/0  Patient to continue ibuprofen  Will call if pain worsens

## 2023-02-02 RX ORDER — BUPROPION HYDROCHLORIDE 150 MG/1
TABLET ORAL
COMMUNITY
Start: 2023-01-09

## 2023-02-02 RX ORDER — AMOXICILLIN AND CLAVULANATE POTASSIUM 875; 125 MG/1; MG/1
875 TABLET, FILM COATED ORAL 2 TIMES DAILY
COMMUNITY
Start: 2023-01-29

## 2023-02-02 RX ORDER — METFORMIN HYDROCHLORIDE 500 MG/1
TABLET, EXTENDED RELEASE ORAL
COMMUNITY
Start: 2022-12-19

## 2023-02-07 ENCOUNTER — OFFICE VISIT (OUTPATIENT)
Dept: GYNECOLOGIC ONCOLOGY | Facility: CLINIC | Age: 63
End: 2023-02-07

## 2023-02-07 VITALS
HEART RATE: 66 BPM | HEIGHT: 66 IN | BODY MASS INDEX: 39.21 KG/M2 | SYSTOLIC BLOOD PRESSURE: 132 MMHG | TEMPERATURE: 97.2 F | WEIGHT: 244 LBS | DIASTOLIC BLOOD PRESSURE: 88 MMHG | OXYGEN SATURATION: 98 %

## 2023-02-07 DIAGNOSIS — Z90.721 S/P HYSTERECTOMY WITH OOPHORECTOMY: Primary | ICD-10-CM

## 2023-02-07 DIAGNOSIS — Z90.710 S/P HYSTERECTOMY WITH OOPHORECTOMY: Primary | ICD-10-CM

## 2023-02-07 RX ORDER — QUETIAPINE FUMARATE 300 MG/1
TABLET, FILM COATED ORAL
COMMUNITY
Start: 2023-02-07

## 2023-02-07 NOTE — PATIENT INSTRUCTIONS
Call if you have any questions related to your surgery  Resume routine care with your primary care providers and Dr Devorah Karimi for your gynecologic needs

## 2023-02-07 NOTE — PROGRESS NOTES
Assessment/Plan:    Problem List Items Addressed This Visit        Other    S/P hysterectomy with oophorectomy - Primary     Patient is doing well after robotic hysterectomy bladder salpingo-oophorectomy  Final pathology benign  At this time she can resume progressively all activities of daily living with no restrictions  She will contact me if she has any questions or problems related to her surgery  Otherwise she will follow with her primary care providers and Dr Jessica Espinosa for any future gynecologic needs  CHIEF COMPLAINT:  Postoperative follow-up       Patient ID: Padma Escobedo is a 60 y o  female  HPI  Patient presents for postoperative follow-up  On January 12th underwent robotic hysterectomy bilateral salpingo-oophorectomy for incidentally identified ovarian mass  Final pathology was benign  She recovered uneventfully  Did not require any postoperative analgesics and her only discomfort was transient constipation  Denies vaginal bleeding, drainage or discharge  Incision is well-healed  The following portions of the patient's history were reviewed and updated as appropriate: allergies, current medications, past family history, past medical history, past social history, past surgical history and problem list     Review of Systems  As per HPI  Review of systems otherwise unremarkable      Current Outpatient Medications:   •  amoxicillin-clavulanate (AUGMENTIN) 875-125 mg per tablet, Take 875 mg by mouth 2 (two) times a day, Disp: , Rfl:   •  buPROPion (WELLBUTRIN XL) 150 mg 24 hr tablet, , Disp: , Rfl:   •  cholecalciferol (VITAMIN D3) 400 units tablet, Take 400 Units by mouth daily, Disp: , Rfl:   •  ciprofloxacin-dexamethasone (CIPRODEX) otic suspension, Administer 4 drops into the left ear 2 (two) times a day, Disp: , Rfl:   •  clotrimazole (LOTRIMIN) 1 % cream, APPLY TO AFFECTED AREA(S) TWO TIMES A DAY AS NEEDED AT CORNERS OF THE MOUTH , Disp: , Rfl:   •  gabapentin (NEURONTIN) 300 mg capsule, Take 300 mg by mouth 2 (two) times a day, Disp: , Rfl:   •  ibuprofen (MOTRIN) 800 mg tablet, Take 800 mg by mouth, Disp: , Rfl:   •  lithium carbonate 150 mg capsule, Take 150 mg by mouth every morning, Disp: , Rfl:   •  lithium carbonate 300 mg capsule, Take 300 mg by mouth daily at bedtime, Disp: , Rfl:   •  Multiple Vitamins-Minerals (Centrum Silver Ultra Womens) TABS, Take 1 tablet by mouth daily, Disp: , Rfl:   •  mupirocin (BACTROBAN) 2 % ointment, As directed for ear, Disp: , Rfl:   •  pantoprazole (PROTONIX) 40 mg tablet, Take 40 mg by mouth daily, Disp: , Rfl:   •  propranolol (INDERAL) 40 mg tablet, Take 40 mg by mouth 2 (two) times a day, Disp: , Rfl:   •  QUEtiapine (SEROquel) 300 mg tablet, , Disp: , Rfl:   •  QUEtiapine (SEROquel) 50 mg tablet, 50mg bid, 300mg qhs, Disp: , Rfl:   •  buPROPion (WELLBUTRIN) 100 mg tablet, Take 100 mg by mouth daily (Patient not taking: Reported on 2/7/2023), Disp: , Rfl:   •  metFORMIN (GLUCOPHAGE-XR) 500 mg 24 hr tablet, , Disp: , Rfl:   •  oxyCODONE (Roxicodone) 5 immediate release tablet, Take 1 tablet (5 mg total) by mouth every 6 (six) hours as needed for moderate pain Max Daily Amount: 20 mg (Patient not taking: Reported on 2/7/2023), Disp: 10 tablet, Rfl: 0    Objective:    Blood pressure 132/88, pulse 66, temperature (!) 97 2 °F (36 2 °C), height 5' 6" (1 676 m), weight 111 kg (244 lb), SpO2 98 %  Body mass index is 39 38 kg/m²  Body surface area is 2 18 meters squared  Physical Exam  Abdomen: Incisions well-healed  No hernias  Pelvic: Normal external genitalia  The vagina with no lesions  Vaginal cuff well-healed  No granulation tissue  No dehiscence      Aneesh Simons MD, [address]  2/7/2023  2:00 PM

## 2023-02-07 NOTE — ASSESSMENT & PLAN NOTE
Patient is doing well after robotic hysterectomy bladder salpingo-oophorectomy  Final pathology benign  At this time she can resume progressively all activities of daily living with no restrictions  She will contact me if she has any questions or problems related to her surgery  Otherwise she will follow with her primary care providers and Dr Norris Shen for any future gynecologic needs

## (undated) DEVICE — NEEDLE COUNTER LG W/RULER

## (undated) DEVICE — CANNULA SEAL

## (undated) DEVICE — PROGRASP FORCEPS: Brand: ENDOWRIST

## (undated) DEVICE — KIT, BETHLEHEM ROBOTIC PROST: Brand: CARDINAL HEALTH

## (undated) DEVICE — MAYO STAND COVER: Brand: CONVERTORS

## (undated) DEVICE — INTENDED FOR TISSUE SEPARATION, AND OTHER PROCEDURES THAT REQUIRE A SHARP SURGICAL BLADE TO PUNCTURE OR CUT.: Brand: BARD-PARKER SAFETY BLADES SIZE 11, STERILE

## (undated) DEVICE — SUT STRATAFIX SPIRAL 2-0 CT-1 30 CM SXPP1B410

## (undated) DEVICE — SYRINGE 10ML LL CONTROL TOP

## (undated) DEVICE — MONOPOLAR CURVED SCISSORS: Brand: ENDOWRIST

## (undated) DEVICE — VESSEL SEALER EXTEND: Brand: ENDOWRIST

## (undated) DEVICE — ARM DRAPE

## (undated) DEVICE — UTERINE MANIPULATOR RUMI 6.7 X 8 CM

## (undated) DEVICE — SUT MONOCRYL 4-0 PS-2 27 IN Y426H

## (undated) DEVICE — TRAY FOLEY 16FR URIMETER SILICONE SURESTEP

## (undated) DEVICE — SPECIMEN TRAP: Brand: ARGYLE

## (undated) DEVICE — SUT VICRYL 0 CT-1 36 IN J946H

## (undated) DEVICE — [HIGH FLOW INSUFFLATOR,  DO NOT USE IF PACKAGE IS DAMAGED,  KEEP DRY,  KEEP AWAY FROM SUNLIGHT,  PROTECT FROM HEAT AND RADIOACTIVE SOURCES.]: Brand: PNEUMOSURE

## (undated) DEVICE — 3000CC GUARDIAN II: Brand: GUARDIAN

## (undated) DEVICE — GLOVE PI ULTRA TOUCH SZ.8.5

## (undated) DEVICE — ANTI-FOG SOLUTION WITH FOAM PAD: Brand: DEVON

## (undated) DEVICE — GLOVE INDICATOR PI UNDERGLOVE SZ 7 BLUE

## (undated) DEVICE — ADHESIVE SKIN HIGH VISCOSITY EXOFIN 1ML

## (undated) DEVICE — ELECTRO LUBE IS A SINGLE PATIENT USE DEVICE THAT IS INTENDED TO BE USED ON ELECTROSURGICAL ELECTRODES TO REDUCE STICKING.: Brand: KEY SURGICAL ELECTRO LUBE

## (undated) DEVICE — LARGE NEEDLE DRIVER: Brand: ENDOWRIST

## (undated) DEVICE — GLOVE INDICATOR PI UNDERGLOVE SZ 9 BLUE

## (undated) DEVICE — ASTOUND STANDARD SURGICAL GOWN, XL: Brand: CONVERTORS

## (undated) DEVICE — TROCAR: Brand: KII FIOS FIRST ENTRY

## (undated) DEVICE — SCD SEQUENTIAL COMPRESSION COMFORT SLEEVE MEDIUM KNEE LENGTH: Brand: KENDALL SCD

## (undated) DEVICE — SYRINGE 50ML LL

## (undated) DEVICE — NEEDLE 25G X 1 1/2

## (undated) DEVICE — PREMIUM DRY TRAY LF: Brand: MEDLINE INDUSTRIES, INC.

## (undated) DEVICE — TIP COVER ACCESSORY

## (undated) DEVICE — CHLORAPREP HI-LITE 26ML ORANGE

## (undated) DEVICE — COLUMN DRAPE

## (undated) DEVICE — EXIDINE 4 PCT

## (undated) DEVICE — DRAPE EQUIPMENT RF WAND

## (undated) DEVICE — OCCLUDER COLPO-PNEUMO